# Patient Record
Sex: MALE | URBAN - METROPOLITAN AREA
[De-identification: names, ages, dates, MRNs, and addresses within clinical notes are randomized per-mention and may not be internally consistent; named-entity substitution may affect disease eponyms.]

---

## 2017-01-16 PROBLEM — 442685003 NASH - NONALCOHOLIC STEATOHEPATITIS: Status: ACTIVE | Noted: 2017-01-16

## 2021-09-29 ENCOUNTER — TELEPHONE ENCOUNTER (OUTPATIENT)
Dept: URBAN - METROPOLITAN AREA CLINIC 35 | Facility: CLINIC | Age: 66
End: 2021-09-29

## 2021-09-29 ENCOUNTER — OFFICE VISIT (OUTPATIENT)
Dept: URBAN - METROPOLITAN AREA CLINIC 35 | Facility: CLINIC | Age: 66
End: 2021-09-29

## 2021-09-29 VITALS
WEIGHT: 208 LBS | OXYGEN SATURATION: 98 % | HEART RATE: 85 BPM | SYSTOLIC BLOOD PRESSURE: 138 MMHG | DIASTOLIC BLOOD PRESSURE: 92 MMHG | HEIGHT: 71 IN | BODY MASS INDEX: 29.12 KG/M2

## 2021-09-29 PROBLEM — 428283002 HISTORY OF POLYP OF COLON (SITUATION): Status: ACTIVE | Noted: 2021-09-29

## 2021-09-29 RX ORDER — CHLORHEXIDINE GLUCONATE 4 %
LIQUID (ML) TOPICAL
Status: ACTIVE | COMMUNITY

## 2021-09-29 RX ORDER — LISINOPRIL 10 MG/1
1 TABLET TABLET ORAL TWICE A DAY
Status: ACTIVE | COMMUNITY

## 2021-09-29 RX ORDER — ASPIRIN 81 MG/1
1 TABLET TABLET, COATED ORAL ONCE A DAY
Status: ACTIVE | COMMUNITY

## 2021-09-29 RX ORDER — HYDROCORTISONE ACETATE 25 MG/1
1 SUPPOSITORY SUPPOSITORY RECTAL THREE TIMES A DAY
Qty: 90 | Refills: 0 | Status: DISCONTINUED | COMMUNITY
Start: 2018-12-10

## 2021-09-29 RX ORDER — LANSOPRAZOLE 30 MG/1
1 CAPSULE CAPSULE, DELAYED RELEASE ORAL ONCE A DAY
Status: ACTIVE | COMMUNITY

## 2021-09-29 RX ORDER — CLOPIDOGREL 75 MG/1
1 TABLET TABLET, FILM COATED ORAL ONCE A DAY
Status: ACTIVE | COMMUNITY

## 2021-09-29 RX ORDER — NITROGLYCERIN 4 MG/G
1 INCH OF OINTMENT OINTMENT RECTAL BID
Qty: 30 GRAM | Refills: 1 | Status: DISCONTINUED | COMMUNITY
Start: 2019-04-16

## 2021-09-29 RX ORDER — MULTIVIT-MIN/IRON/FOLIC ACID/K 18-600-40
CAPSULE ORAL
Status: ACTIVE | COMMUNITY

## 2021-09-29 RX ORDER — ATORVASTATIN CALCIUM 80 MG/1
1 TABLET TABLET, FILM COATED ORAL ONCE A DAY
Status: ACTIVE | COMMUNITY

## 2021-09-29 RX ORDER — POLYETHYLENE GLYCOL 3350, SODIUM SULFATE, SODIUM CHLORIDE, POTASSIUM CHLORIDE, ASCORBIC ACID, SODIUM ASCORBATE 140-9-5.2G
AS DIRECTED KIT ORAL AS DIRECTED
Qty: 1 | Refills: 0 | OUTPATIENT
Start: 2021-09-29

## 2021-09-29 RX ORDER — UBIDECARENONE 75 MG
1 CAPSULE WITH A MEAL CAPSULE ORAL ONCE A DAY
Status: ACTIVE | COMMUNITY

## 2021-09-29 RX ORDER — NITROGLYCERIN 4 MG/G
AS DIRECTED OINTMENT RECTAL TID
Qty: 1 | Refills: 0 | OUTPATIENT
Start: 2021-09-29

## 2021-09-29 RX ORDER — CARVEDILOL 12.5 MG/1
TABLET, FILM COATED ORAL BID
Status: ACTIVE | COMMUNITY

## 2021-09-29 RX ORDER — B-COMPLEX WITH VITAMIN C
TABLET ORAL
Status: ACTIVE | COMMUNITY

## 2021-09-29 NOTE — HPI-MIGRATED HPI
;   ;     Surveillance Colonoscopy : 66 year old male presents today for consultation for a surveillance colonoscopy. He has a personal history of colonic polyps and his last colonoscopy was completed on 12/15/2017 with Dr Uribe results are noted below. His paternal uncle had colon cancer while in his late 60's. Currently has 1 bowel movement per day. Stools are soft when he uses Metamucil without blood, mucus or melena. When he has a BM he does not feel like he completely empties his bowels. He admits daily episodes of bloating/gas. He has noticed when he takes Metamucil it makes him bloated and gassy. Maybe a year ago he was lifting a tree he had cut down, and felt something pop in his lower abdomen off to the right slightly. He states at times something is pushing against his bladder.    Findings; Three sessile 6 to 10 mm polyps(fragments of tubular adenomas) were found in the hepatic flexure, Three sessile 5 to 10 mm polyps (fragments of tubular adenoma)were found in the Transverse colon, A 6 mm sessile polyp(tubular adenoma) was found in the rectum, Non bleeding internal large Grade lll hemorrhoids, multiple medium mouthed diverticula were found in the sigmoid and descending colon. ;   Rectal Bleeding : He admits episodes of rectal bleeding still since last visit.       Last visit 04/16/2019 Patient presents today for a follow-up office visit from 12/10/2018. Patient states that he used the Anucort once or twice a day, just about since his last office visit. He states that it is impossible to do the 3 times a day, as directed, so he does either once or twice a day. He states that he has been eating a lot more fiber. Patient is NOT using stool softeners anymore. He denies any rectal bleeding since the last office visit. Patient admits to having some rectal pain/irritation. This happens when he skips a day of using the suppository and even some days that he only uses one/day. Patient states that he uses Recticare every time he uses a suppository.  On last visit 12/10/2018, Patient presents today for a consultation about rectal bleeding. Bleeding started  two days ago and has happened 2 days in a row. The blood is bright red in color and is present in the toilet and on the toilet paper after a bowel movement. He states that this morning he did not notice any blood this morning.  He states that his hemorrhoids flared-up 3 months ago. He did try Preparation-H two days ago with slight relief of symptoms.   Patient currently admits one bowel movement per day with soft stools. He has been trying to keep his stools soft by drinking coffee and taking fiber supplements. He does admits rectal pain and it'll feel like he is pooping sand paper.  Patient does admit he's got some bloating as well.  He's also had a pain for a while in his LUQ.  He denies any mucus, melena, pruritus ani, or abdominal cramping/pain. He has also been light headed and dizzy. He has also been tired than normal.   He did have a colonoscopy in 12/15/2017.  Colonoscopy-OP  12/15/2017 Three sessile polyps were found in the hepatic flexure. The polyps were 6 to 10 mm in size. Three sessile polyps were found in the transverse colon. The polyps were 5 to 10 mm in size. A 6 mm polyp was found in the rectum. The polyp was sessile. Non-bleeding internal hemorrhoids were found during retroflexion. The hemorrhoids were large and Grade III. Multiple medium-mouthed diverticula were found in the sigmoid colon and descending colon.   Lab:Pathology - Colonoscopy 12/15/2017 Colon, hepatic flexure, polyp: fragments of tubular adenoma. Colon, transverse, polyp: fragments of tubular adenoma. Rectum, polyp: tubular adenoma;

## 2021-10-22 ENCOUNTER — OFFICE VISIT (OUTPATIENT)
Dept: URBAN - METROPOLITAN AREA MEDICAL CENTER 10 | Facility: MEDICAL CENTER | Age: 66
End: 2021-10-22

## 2021-11-11 ENCOUNTER — OFFICE VISIT (OUTPATIENT)
Dept: URBAN - METROPOLITAN AREA CLINIC 35 | Facility: CLINIC | Age: 66
End: 2021-11-11

## 2021-11-11 VITALS
HEART RATE: 57 BPM | DIASTOLIC BLOOD PRESSURE: 77 MMHG | OXYGEN SATURATION: 98 % | WEIGHT: 207.6 LBS | HEIGHT: 71 IN | BODY MASS INDEX: 29.06 KG/M2 | SYSTOLIC BLOOD PRESSURE: 116 MMHG

## 2021-11-11 PROBLEM — 398050005 DIVERTICULAR DISEASE OF COLON: Status: ACTIVE | Noted: 2017-12-29

## 2021-11-11 RX ORDER — NITROGLYCERIN 4 MG/G
AS DIRECTED OINTMENT RECTAL TID
Qty: 1 | Refills: 0 | Status: ACTIVE | COMMUNITY
Start: 2021-09-29

## 2021-11-11 RX ORDER — POLYETHYLENE GLYCOL 3350, SODIUM SULFATE, SODIUM CHLORIDE, POTASSIUM CHLORIDE, ASCORBIC ACID, SODIUM ASCORBATE 140-9-5.2G
AS DIRECTED KIT ORAL AS DIRECTED
Qty: 1 | Refills: 0 | Status: ON HOLD | COMMUNITY
Start: 2021-09-29

## 2021-11-11 RX ORDER — MULTIVIT-MIN/IRON/FOLIC ACID/K 18-600-40
CAPSULE ORAL
Status: ACTIVE | COMMUNITY

## 2021-11-11 RX ORDER — LISINOPRIL 10 MG/1
1 TABLET TABLET ORAL TWICE A DAY
Status: ACTIVE | COMMUNITY

## 2021-11-11 RX ORDER — CARVEDILOL 12.5 MG/1
TABLET, FILM COATED ORAL BID
Status: ACTIVE | COMMUNITY

## 2021-11-11 RX ORDER — CHLORHEXIDINE GLUCONATE 4 %
LIQUID (ML) TOPICAL
Status: ACTIVE | COMMUNITY

## 2021-11-11 RX ORDER — CLOPIDOGREL 75 MG/1
1 TABLET TABLET, FILM COATED ORAL ONCE A DAY
Status: ACTIVE | COMMUNITY

## 2021-11-11 RX ORDER — B-COMPLEX WITH VITAMIN C
TABLET ORAL
Status: ACTIVE | COMMUNITY

## 2021-11-11 RX ORDER — LANSOPRAZOLE 30 MG/1
1 CAPSULE CAPSULE, DELAYED RELEASE ORAL ONCE A DAY
Status: ACTIVE | COMMUNITY

## 2021-11-11 RX ORDER — UBIDECARENONE 75 MG
1 CAPSULE WITH A MEAL CAPSULE ORAL ONCE A DAY
Status: ACTIVE | COMMUNITY

## 2021-11-11 RX ORDER — ASPIRIN 81 MG/1
1 TABLET TABLET, COATED ORAL ONCE A DAY
Status: ACTIVE | COMMUNITY

## 2021-11-11 RX ORDER — ATORVASTATIN CALCIUM 80 MG/1
1 TABLET TABLET, FILM COATED ORAL ONCE A DAY
Status: ACTIVE | COMMUNITY

## 2021-11-11 NOTE — HPI-MIGRATED HPI
;   ;     Surveillance Colonoscopy : Patient presents today for colonoscopy follow up. Patient had colonoscopy completed on 10/22/2021 by Dr. Uribe, with results noted below. Patient denies any complications after procedure.  Patient currently admits normal bowel habits. Patient denies rectal bleeding, melena, or mucus.   He admits to some constipation, but has been using Benefiber which is helping. He has some rectal pain with BMs.          Last Visit(09/29/2021) 66 year old male presents today for consultation for a surveillance colonoscopy. He has a personal history of colonic polyps and his last colonoscopy was completed on 12/15/2017 with Dr Uribe results are noted below. His paternal uncle had colon cancer while in his late 60's. Currently has 1 bowel movement per day. Stools are soft when he uses Metamucil without blood, mucus or melena. When he has a BM he does not feel like he completely empties his bowels. He admits daily episodes of bloating/gas. He has noticed when he takes Metamucil it makes him bloated and gassy. Maybe a year ago he was lifting a tree he had cut down, and felt something pop in his lower abdomen off to the right slightly. He states at times something is pushing against his bladder.    Findings; Three sessile 6 to 10 mm polyps(fragments of tubular adenomas) were found in the hepatic flexure, Three sessile 5 to 10 mm polyps (fragments of tubular adenoma)were found in the Transverse colon, A 6 mm sessile polyp(tubular adenoma) was found in the rectum, Non bleeding internal large Grade lll hemorrhoids, multiple medium mouthed diverticula were found in the sigmoid and descending colon.;   Rectal Bleeding : He admits/denies any episodes of rectal bleeding still since last visit.        Last Visit(09/29/2021) He admits episodes of rectal bleeding still since last visit.       Last visit 04/16/2019 Patient presents today for a follow-up office visit from 12/10/2018. Patient states that he used the Anucort once or twice a day, just about since his last office visit. He states that it is impossible to do the 3 times a day, as directed, so he does either once or twice a day. He states that he has been eating a lot more fiber. Patient is NOT using stool softeners anymore. He denies any rectal bleeding since the last office visit. Patient admits to having some rectal pain/irritation. This happens when he skips a day of using the suppository and even some days that he only uses one/day. Patient states that he uses Recticare every time he uses a suppository.  On last visit 12/10/2018, Patient presents today for a consultation about rectal bleeding. Bleeding started  two days ago and has happened 2 days in a row. The blood is bright red in color and is present in the toilet and on the toilet paper after a bowel movement. He states that this morning he did not notice any blood this morning.  He states that his hemorrhoids flared-up 3 months ago. He did try Preparation-H two days ago with slight relief of symptoms.   Patient currently admits one bowel movement per day with soft stools. He has been trying to keep his stools soft by drinking coffee and taking fiber supplements. He does admits rectal pain and it'll feel like he is pooping sand paper.  Patient does admit he's got some bloating as well.  He's also had a pain for a while in his LUQ.  He denies any mucus, melena, pruritus ani, or abdominal cramping/pain. He has also been light headed and dizzy. He has also been tired than normal.   He did have a colonoscopy in 12/15/2017.  Colonoscopy-OP  12/15/2017 Three sessile polyps were found in the hepatic flexure. The polyps were 6 to 10 mm in size. Three sessile polyps were found in the transverse colon. The polyps were 5 to 10 mm in size. A 6 mm polyp was found in the rectum. The polyp was sessile. Non-bleeding internal hemorrhoids were found during retroflexion. The hemorrhoids were large and Grade III. Multiple medium-mouthed diverticula were found in the sigmoid colon and descending colon.   Lab:Pathology - Colonoscopy 12/15/2017 Colon, hepatic flexure, polyp: fragments of tubular adenoma. Colon, transverse, polyp: fragments of tubular adenoma. Rectum, polyp: tubular adenoma;

## 2025-03-26 NOTE — EXAM-MIGRATED EXAMINATIONS
GENERAL APPEARANCE: - pleasant, well nourished, well developed, in no acute distress;   ORAL CAVITY: - mucosa moist;   HEART: - S1, S2 normal, regular rate and rhythm;   LUNGS: - clear to auscultation bilaterally;   ABDOMEN: - soft, nontender, nondistended, no rebound tenderness, bowel sounds present;   
conjunctiva clear